# Patient Record
Sex: MALE | Race: OTHER | HISPANIC OR LATINO
[De-identification: names, ages, dates, MRNs, and addresses within clinical notes are randomized per-mention and may not be internally consistent; named-entity substitution may affect disease eponyms.]

---

## 2021-07-01 ENCOUNTER — APPOINTMENT (OUTPATIENT)
Dept: HEART AND VASCULAR | Facility: CLINIC | Age: 86
End: 2021-07-01
Payer: MEDICARE

## 2021-07-01 ENCOUNTER — NON-APPOINTMENT (OUTPATIENT)
Age: 86
End: 2021-07-01

## 2021-07-01 ENCOUNTER — LABORATORY RESULT (OUTPATIENT)
Age: 86
End: 2021-07-01

## 2021-07-01 DIAGNOSIS — R94.31 ABNORMAL ELECTROCARDIOGRAM [ECG] [EKG]: ICD-10-CM

## 2021-07-01 PROCEDURE — 99204 OFFICE O/P NEW MOD 45 MIN: CPT

## 2021-07-01 PROCEDURE — 93000 ELECTROCARDIOGRAM COMPLETE: CPT

## 2021-07-01 PROCEDURE — 36415 COLL VENOUS BLD VENIPUNCTURE: CPT

## 2021-07-02 LAB
ALBUMIN SERPL ELPH-MCNC: 4.4 G/DL
ALP BLD-CCNC: 74 U/L
ALT SERPL-CCNC: 9 U/L
ANION GAP SERPL CALC-SCNC: 15 MMOL/L
AST SERPL-CCNC: 14 U/L
BILIRUB SERPL-MCNC: 0.3 MG/DL
BUN SERPL-MCNC: 29 MG/DL
CALCIUM SERPL-MCNC: 9.7 MG/DL
CHLORIDE SERPL-SCNC: 109 MMOL/L
CHOLEST SERPL-MCNC: 121 MG/DL
CO2 SERPL-SCNC: 22 MMOL/L
CREAT SERPL-MCNC: 1.19 MG/DL
GLUCOSE SERPL-MCNC: 91 MG/DL
HDLC SERPL-MCNC: 40 MG/DL
LDLC SERPL CALC-MCNC: 43 MG/DL
NONHDLC SERPL-MCNC: 81 MG/DL
POTASSIUM SERPL-SCNC: 5.2 MMOL/L
PROT SERPL-MCNC: 7.4 G/DL
SODIUM SERPL-SCNC: 145 MMOL/L
TRIGL SERPL-MCNC: 187 MG/DL

## 2021-07-06 VITALS — SYSTOLIC BLOOD PRESSURE: 120 MMHG | RESPIRATION RATE: 16 BRPM | HEART RATE: 68 BPM | DIASTOLIC BLOOD PRESSURE: 80 MMHG

## 2021-07-06 PROBLEM — R94.31 ABNORMAL ECG: Status: ACTIVE | Noted: 2021-07-06

## 2021-07-06 NOTE — DISCUSSION/SUMMARY
[FreeTextEntry1] : EKG: NSR, LBBB, abnormal EKG\par \par Impression: Stable, non-obstructive CAD with chronic LBBB. He is to continue his medication for DM, HTN and hypercholesteremia and follow up in 6 months.

## 2021-07-06 NOTE — REASON FOR VISIT
CARDIOVASCULAR - ADULT    • Maintains optimal cardiac output and hemodynamic stability Progressing        RISK FOR INFECTION - ADULT    • Absence of fever/infection during anticipated neutropenic period Progressing        SAFETY ADULT - FALL    • Free from [Other: ____] : [unfilled] [Other: _____] : [unfilled] [FreeTextEntry1] : This 86 year old man with a h/o non-obstructive CAD comes in for routine follow up. He was last seen by me in April 2015. Since last being seen he has had no chest pain, SOB, palpitations or syncope.  He is currently living independently in a senior care community here is [place]. He reports being compliant with his medication.

## 2021-12-02 ENCOUNTER — APPOINTMENT (OUTPATIENT)
Dept: HEART AND VASCULAR | Facility: CLINIC | Age: 86
End: 2021-12-02

## 2021-12-07 ENCOUNTER — APPOINTMENT (OUTPATIENT)
Dept: NEUROSURGERY | Facility: CLINIC | Age: 86
End: 2021-12-07
Payer: MEDICARE

## 2021-12-07 PROCEDURE — 99205 OFFICE O/P NEW HI 60 MIN: CPT

## 2021-12-07 NOTE — PHYSICAL EXAM
[General Appearance - Alert] : alert [General Appearance - In No Acute Distress] : in no acute distress [General Appearance - Well Nourished] : well nourished [General Appearance - Well Developed] : well developed [Oriented To Time, Place, And Person] : oriented to person, place, and time [Impaired Insight] : insight and judgment were intact [Affect] : the affect was normal [Cranial Nerves Optic (II)] : visual acuity intact bilaterally,  pupils equal round and reactive to light [Cranial Nerves Oculomotor (III)] : extraocular motion intact [Cranial Nerves Trigeminal (V)] : facial sensation intact symmetrically [Cranial Nerves Facial (VII)] : face symmetrical [Cranial Nerves Vestibulocochlear (VIII)] : hearing was intact bilaterally [Cranial Nerves Glossopharyngeal (IX)] : tongue and palate midline [Cranial Nerves Accessory (XI - Cranial And Spinal)] : head turning and shoulder shrug symmetric [Cranial Nerves Hypoglossal (XII)] : there was no tongue deviation with protrusion [Sensation Tactile Decrease] : light touch was intact [FreeTextEntry1] : Forgetful [FreeTextEntry6] : Magnetic gait [FreeTextEntry8] : Magnetic shuffled gait, ambulates with cane

## 2021-12-07 NOTE — HISTORY OF PRESENT ILLNESS
[de-identified] : Mr. Corrales presents in neurosurgical consultation at the request of Dr. Alexandre Madrigal with his daughter in attendance. He lives at a [facility] in [place]. He has a PMHx of DM II, HTN, glaucoma, alcoholism. Patient and daughter report that he was initially evaluated  by Dr. Madrigal in November 2020 for significant cognitive impairment. MRI brain 11/9/20 demonstrated ventriculomegaly. Brain PET 11/24/20 demonstrated no evidence for metabolic derangement. He returned for a follow up visit with Dr. Madrigal this past January, presenting with progressive forgetfulness and new onset urinary incontinence along with a shuffled gait. At this time the patient was referred to neurosurgery for possible normal pressure hydrocephalus, however the patient and family did not pursue this. He recently underwent follow up MRI brain (8/3) detailed below. \par \par Today the patient's daughter provides the majority of the history. She endorses a great than one year history of progressive urinary incontinence and short term memory loss, with continued progression over the last several months. Gait difficulties have remained largely stable. He is scheduled to participate in physical therapy beginning this Friday. Denies any other neurological symptoms.

## 2021-12-07 NOTE — DATA REVIEWED
[de-identified] : MRI BRAIN WITH AND WITHOUT CONTRAST : 8/3/21: IMPRESSION - Stable ventriculomegaly suspicious for moderate hydrocephalus such as communicating or normal pressure hydrocephalus. Correlate clinically. No evidence of vestibular schwannoma or enhancing lesion. Mild, chronic white matter changes.\par MRI BRAIN WITHOUT CONTRAST: 11/24/20; IMPRESSION- Moderately dilated third ventricle and lateral ventricle greater than expected given the appearance of the cortical sulci. Depending on the clinical circumstances the possibility of communicating/normal pressure hydrocephalus may be considered. Mild white matter changes. No evidence intracranial mass.  [de-identified] : PET CT BRAIN :1/24/20: IMPRESSION- No evidence of metabolic abnormality.

## 2021-12-07 NOTE — ASSESSMENT
[FreeTextEntry1] : Mr. Jarrett presents with a history of progressive short term memory loss, urinary incontinence, and largely stable gait difficulties consisting of magnetic gait. The patient has cardinal signs and symptoms of normal pressure hydrocephalus. MRI brain with and without gadolinium 8/3/21 reviewed independently by me demonstrates moderate ventriculomegaly somewhat out of proportion to volume loss. \par \par Natural history discussed. I discussed lumbar drain CSF diversion trial to assess potential benefit of ventricular shunt placement. Risks including but not limited to bleeding, infection, nerve root injury, pain, hematoma requiring evacuation, weakness/paralysis, loss of bowel/bladder function, sensory loss, failure to place lumbar drain, need for subsequent procedures discussed. The patient and his daughter understand the risks, benefits, alternatives and are considering my recommendations. They also understand the possibility that the patient will not experience improvement, in which case shunt placement will not be recommended. I explained this trial would be to objectively evaluate the patient's improvement with CSF diversion as a positive predictor for clinical improvement with ventricular shunt placement. We discussed risks and potential benefits of ventriculoperitoneal shunt placement as well. \par \par I have asked the patient and his daughter to contact my office regarding their decision. I have made myself available for further discussion with the patient, his daughter, and her siblings if they wish. I have offered to see them in the office again as well. If they decide to proceed we will schedule him for lumbar drain placement under conscious sedation for CSF diversion trial. We will obtain consultation with PT for initial NPH evaluation. We will then drain 15 cc of CSF every 2 hours with serial PT, neurology, and neurosurgery assessments during his hospitalization. If they decide not to proceed, then I have recommended continued outpatient physical therapy and remain available to them for assessment in the future. \par \par I have asked them to contact my office for any symptomatic development or progression at which time we can obtain expedited follow up imaging. \par \par A total of 60 minutes were spent relative to this encounter.

## 2022-02-17 ENCOUNTER — APPOINTMENT (OUTPATIENT)
Dept: HEART AND VASCULAR | Facility: CLINIC | Age: 87
End: 2022-02-17
Payer: MEDICARE

## 2022-02-17 PROCEDURE — 99214 OFFICE O/P EST MOD 30 MIN: CPT

## 2022-02-20 VITALS — RESPIRATION RATE: 16 BRPM | DIASTOLIC BLOOD PRESSURE: 70 MMHG | SYSTOLIC BLOOD PRESSURE: 130 MMHG | HEART RATE: 72 BPM

## 2022-02-20 NOTE — DISCUSSION/SUMMARY
[FreeTextEntry1] : Impression: Stable CAD. He is to continue his medication for HTN, DM and HLD and follow up in 6 months.

## 2022-02-20 NOTE — REASON FOR VISIT
[Coronary Artery Disease] : coronary artery disease [Family Member] : family member [FreeTextEntry1] : This 86 year old man with a h/o non-obstructive CAD and baseline LBBB on EKG comes in for routine follow up. He denies chest pain, SOB, palpitations or syncope. He requests a prescription for Viagra. [place] [facility] [place].

## 2022-08-11 ENCOUNTER — APPOINTMENT (OUTPATIENT)
Dept: HEART AND VASCULAR | Facility: CLINIC | Age: 87
End: 2022-08-11

## 2022-08-11 PROCEDURE — 36415 COLL VENOUS BLD VENIPUNCTURE: CPT

## 2022-08-11 PROCEDURE — 99214 OFFICE O/P EST MOD 30 MIN: CPT | Mod: 25

## 2022-08-15 LAB
ALBUMIN SERPL ELPH-MCNC: 4.4 G/DL
ALP BLD-CCNC: 71 U/L
ALT SERPL-CCNC: 16 U/L
ANION GAP SERPL CALC-SCNC: 17 MMOL/L
AST SERPL-CCNC: 14 U/L
BILIRUB SERPL-MCNC: 0.3 MG/DL
BUN SERPL-MCNC: 27 MG/DL
CALCIUM SERPL-MCNC: 9.4 MG/DL
CHLORIDE SERPL-SCNC: 112 MMOL/L
CHOLEST SERPL-MCNC: 128 MG/DL
CO2 SERPL-SCNC: 16 MMOL/L
CREAT SERPL-MCNC: 1.23 MG/DL
EGFR: 57 ML/MIN/1.73M2
GLUCOSE SERPL-MCNC: 100 MG/DL
HDLC SERPL-MCNC: 41 MG/DL
LDLC SERPL CALC-MCNC: 60 MG/DL
NONHDLC SERPL-MCNC: 87 MG/DL
POTASSIUM SERPL-SCNC: 5.2 MMOL/L
PROT SERPL-MCNC: 7.1 G/DL
SODIUM SERPL-SCNC: 145 MMOL/L
TRIGL SERPL-MCNC: 136 MG/DL

## 2022-08-16 VITALS — HEART RATE: 68 BPM | DIASTOLIC BLOOD PRESSURE: 70 MMHG | SYSTOLIC BLOOD PRESSURE: 136 MMHG | RESPIRATION RATE: 16 BRPM

## 2022-08-16 NOTE — REASON FOR VISIT
[Coronary Artery Disease] : coronary artery disease [FreeTextEntry1] : This 87 year old man with a h/o non-obstructive CAD and baseline LBBB comes in for routine follow up. He denies chest pain, SOB, palpitations or syncope. He lives in a correction community in [facility].

## 2022-08-16 NOTE — DISCUSSION/SUMMARY
[FreeTextEntry1] : Impression: Stable CAD. WIll send blood for cholesterol screening (blood drawn in office).  He is to continue his medication for HTN, DM and HLD.

## 2023-02-09 ENCOUNTER — APPOINTMENT (OUTPATIENT)
Dept: HEART AND VASCULAR | Facility: CLINIC | Age: 88
End: 2023-02-09
Payer: MEDICARE

## 2023-02-09 PROCEDURE — 99214 OFFICE O/P EST MOD 30 MIN: CPT

## 2023-02-24 VITALS — SYSTOLIC BLOOD PRESSURE: 110 MMHG | RESPIRATION RATE: 16 BRPM | DIASTOLIC BLOOD PRESSURE: 76 MMHG | HEART RATE: 72 BPM

## 2023-02-24 NOTE — REASON FOR VISIT
[Coronary Artery Disease] : coronary artery disease [FreeTextEntry1] : This 87 year old man with a h/o non-obstructive CAD comes in for routine follow up. He is currently at an assisted living apartment in [facility]. Today he denies chest pain, SOB, palpitations or syncope.

## 2023-02-24 NOTE — DISCUSSION/SUMMARY
[FreeTextEntry1] : Impression: Stable CAD. He is to continue medication for HTN and HLD and follow up in 6 months.

## 2023-08-31 ENCOUNTER — APPOINTMENT (OUTPATIENT)
Dept: HEART AND VASCULAR | Facility: CLINIC | Age: 88
End: 2023-08-31

## 2023-09-07 ENCOUNTER — APPOINTMENT (OUTPATIENT)
Dept: HEART AND VASCULAR | Facility: CLINIC | Age: 88
End: 2023-09-07
Payer: MEDICARE

## 2023-09-07 PROCEDURE — 36415 COLL VENOUS BLD VENIPUNCTURE: CPT

## 2023-09-07 PROCEDURE — 99214 OFFICE O/P EST MOD 30 MIN: CPT

## 2023-09-08 LAB
ALBUMIN SERPL ELPH-MCNC: 4.4 G/DL
ALP BLD-CCNC: 76 U/L
ALT SERPL-CCNC: 19 U/L
ANION GAP SERPL CALC-SCNC: 16 MMOL/L
AST SERPL-CCNC: 17 U/L
BILIRUB SERPL-MCNC: 0.2 MG/DL
BUN SERPL-MCNC: 19 MG/DL
CALCIUM SERPL-MCNC: 9.2 MG/DL
CHLORIDE SERPL-SCNC: 111 MMOL/L
CHOLEST SERPL-MCNC: 117 MG/DL
CO2 SERPL-SCNC: 18 MMOL/L
CREAT SERPL-MCNC: 1.18 MG/DL
EGFR: 60 ML/MIN/1.73M2
GLUCOSE SERPL-MCNC: 117 MG/DL
HDLC SERPL-MCNC: 40 MG/DL
LDLC SERPL CALC-MCNC: 47 MG/DL
NONHDLC SERPL-MCNC: 76 MG/DL
POTASSIUM SERPL-SCNC: 4.5 MMOL/L
PROT SERPL-MCNC: 7.1 G/DL
SODIUM SERPL-SCNC: 145 MMOL/L
TRIGL SERPL-MCNC: 179 MG/DL

## 2023-09-11 VITALS — DIASTOLIC BLOOD PRESSURE: 60 MMHG | HEART RATE: 44 BPM | SYSTOLIC BLOOD PRESSURE: 90 MMHG | RESPIRATION RATE: 16 BRPM

## 2023-09-28 ENCOUNTER — APPOINTMENT (OUTPATIENT)
Dept: HEART AND VASCULAR | Facility: CLINIC | Age: 88
End: 2023-09-28
Payer: MEDICARE

## 2023-09-28 PROCEDURE — 99214 OFFICE O/P EST MOD 30 MIN: CPT

## 2023-10-02 VITALS — DIASTOLIC BLOOD PRESSURE: 80 MMHG | SYSTOLIC BLOOD PRESSURE: 120 MMHG | RESPIRATION RATE: 16 BRPM | HEART RATE: 64 BPM

## 2023-10-03 ENCOUNTER — TRANSCRIPTION ENCOUNTER (OUTPATIENT)
Age: 88
End: 2023-10-03

## 2023-10-12 ENCOUNTER — TRANSCRIPTION ENCOUNTER (OUTPATIENT)
Age: 88
End: 2023-10-12

## 2023-10-26 ENCOUNTER — APPOINTMENT (OUTPATIENT)
Dept: HEART AND VASCULAR | Facility: CLINIC | Age: 88
End: 2023-10-26
Payer: MEDICARE

## 2023-10-26 DIAGNOSIS — I42.9 CARDIOMYOPATHY, UNSPECIFIED: ICD-10-CM

## 2023-10-26 PROCEDURE — 99214 OFFICE O/P EST MOD 30 MIN: CPT

## 2023-10-30 VITALS — HEART RATE: 72 BPM | RESPIRATION RATE: 16 BRPM | DIASTOLIC BLOOD PRESSURE: 60 MMHG | SYSTOLIC BLOOD PRESSURE: 80 MMHG

## 2023-10-30 PROBLEM — I42.9 SECONDARY CARDIOMYOPATHY: Status: ACTIVE | Noted: 2023-10-30

## 2023-11-09 ENCOUNTER — NON-APPOINTMENT (OUTPATIENT)
Age: 88
End: 2023-11-09

## 2023-11-27 ENCOUNTER — NON-APPOINTMENT (OUTPATIENT)
Age: 88
End: 2023-11-27

## 2023-11-27 ENCOUNTER — APPOINTMENT (OUTPATIENT)
Dept: VASCULAR SURGERY | Facility: CLINIC | Age: 88
End: 2023-11-27
Payer: MEDICARE

## 2023-11-27 VITALS
BODY MASS INDEX: 23.74 KG/M2 | HEART RATE: 71 BPM | WEIGHT: 134 LBS | SYSTOLIC BLOOD PRESSURE: 157 MMHG | DIASTOLIC BLOOD PRESSURE: 78 MMHG | HEIGHT: 63 IN

## 2023-11-27 PROCEDURE — 99203 OFFICE O/P NEW LOW 30 MIN: CPT

## 2023-11-27 RX ORDER — SILDENAFIL 50 MG/1
50 TABLET ORAL
Qty: 30 | Refills: 1 | Status: DISCONTINUED | COMMUNITY
Start: 2022-02-17 | End: 2023-11-27

## 2023-11-27 RX ORDER — SIMVASTATIN 20 MG/1
20 TABLET, FILM COATED ORAL
Refills: 0 | Status: ACTIVE | COMMUNITY

## 2023-11-28 ENCOUNTER — APPOINTMENT (OUTPATIENT)
Dept: VASCULAR SURGERY | Facility: CLINIC | Age: 88
End: 2023-11-28
Payer: MEDICARE

## 2023-11-28 DIAGNOSIS — E11.51 TYPE 2 DIABETES MELLITUS WITH DIABETIC PERIPHERAL ANGIOPATHY W/OUT GANGRENE: ICD-10-CM

## 2023-11-28 PROCEDURE — 93925 LOWER EXTREMITY STUDY: CPT

## 2023-11-28 PROCEDURE — 93922 UPR/L XTREMITY ART 2 LEVELS: CPT

## 2023-12-04 RX ORDER — METOPROLOL SUCCINATE 25 MG/1
25 TABLET, EXTENDED RELEASE ORAL
Qty: 90 | Refills: 1 | Status: ACTIVE | COMMUNITY
Start: 2023-12-04 | End: 1900-01-01

## 2024-01-25 ENCOUNTER — APPOINTMENT (OUTPATIENT)
Dept: HEART AND VASCULAR | Facility: CLINIC | Age: 89
End: 2024-01-25
Payer: MEDICARE

## 2024-01-25 DIAGNOSIS — I42.8 OTHER CARDIOMYOPATHIES: ICD-10-CM

## 2024-01-25 PROCEDURE — 99214 OFFICE O/P EST MOD 30 MIN: CPT

## 2024-01-30 VITALS — DIASTOLIC BLOOD PRESSURE: 80 MMHG | HEART RATE: 68 BPM | SYSTOLIC BLOOD PRESSURE: 140 MMHG | RESPIRATION RATE: 16 BRPM

## 2024-01-30 PROBLEM — I42.8 CARDIOMYOPATHY, NONISCHEMIC: Status: ACTIVE | Noted: 2024-01-30

## 2024-01-30 NOTE — DISCUSSION/SUMMARY
[FreeTextEntry1] : Impression: Will order echocardiogram to assess improvement of cardiomyopathy. He is to restart Metoprolol for HTN. He is to follow up in 3 months.

## 2024-01-30 NOTE — REASON FOR VISIT
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 He had his medication stopped for HTN due to low BP. Today he denies chest pain, SOB, palpitations or syncope. He states he feels much better. His BP is back in the 140-160 range.

## 2024-01-30 NOTE — PHYSICAL EXAM

## 2024-02-16 ENCOUNTER — RESULT REVIEW (OUTPATIENT)
Age: 89
End: 2024-02-16

## 2024-02-20 ENCOUNTER — RESULT REVIEW (OUTPATIENT)
Age: 89
End: 2024-02-20

## 2024-02-22 ENCOUNTER — RESULT REVIEW (OUTPATIENT)
Age: 89
End: 2024-02-22

## 2024-02-22 ENCOUNTER — TRANSCRIPTION ENCOUNTER (OUTPATIENT)
Age: 89
End: 2024-02-22

## 2024-03-06 ENCOUNTER — TRANSCRIPTION ENCOUNTER (OUTPATIENT)
Age: 89
End: 2024-03-06

## 2024-03-13 ENCOUNTER — APPOINTMENT (OUTPATIENT)
Dept: VASCULAR SURGERY | Facility: CLINIC | Age: 89
End: 2024-03-13
Payer: MEDICARE

## 2024-03-13 VITALS
BODY MASS INDEX: 23.39 KG/M2 | SYSTOLIC BLOOD PRESSURE: 136 MMHG | DIASTOLIC BLOOD PRESSURE: 71 MMHG | WEIGHT: 132 LBS | HEART RATE: 72 BPM | HEIGHT: 63 IN

## 2024-03-13 PROCEDURE — 99024 POSTOP FOLLOW-UP VISIT: CPT

## 2024-03-14 NOTE — DISCUSSION/SUMMARY
[FreeTextEntry1] : 87 yo male approximately 3 weeks s/p bilateral open thrombectomy,and fasciotomy. The patient has a 2+ left dp pulse and a biphasic right dp signal on physical exam. The patient is doing well post procedure and his incisions are all healing appropriately. His staples were removed in the  office and his left leg was dressed. I emphasized the importance of leg elevation and ambulation to the patient and his daughter. Continue Eliquis as prescribed.   He will follow up in 4 weeks for reassessment.

## 2024-03-14 NOTE — REVIEW OF SYSTEMS
[As Noted in HPI] : as noted in HPI [Fever] : no fever [Chills] : no chills [Leg Claudication] : no intermittent leg claudication [Limb Swelling] : no limb swelling [Limb Pain] : no limb pain [Lower Ext Edema] : no extremity edema [Limb Weakness] : no limb weakness [Difficulty Walking] : no difficulty walking

## 2024-03-14 NOTE — PHYSICAL EXAM
[Normal Breath Sounds] : Normal breath sounds [2+] : left 2+ [Ankle Swelling Bilaterally] : bilaterally  [Alert] : alert [Oriented to Person] : oriented to person [Oriented to Place] : oriented to place [Oriented to Time] : oriented to time [JVD] : no jugular venous distention  [Ankle Swelling (On Exam)] : not present [FreeTextEntry1] : biphasic PT [de-identified] : Awake and Alert, [de-identified] : Groin Incision sites healing appropriately - staples removed, fasciotomy site clean, bl feet warm and well perfused [de-identified] : Appropriate affect. [de-identified] : No gross motor or sensory deficits.

## 2024-03-14 NOTE — REASON FOR VISIT
[Family Member] : family member [de-identified] : 2/19/24 [de-identified] : Bilateral open thrombectomy, completion angiogram and left lateral and anterior fasciotomy [de-identified] : 89 yo male approximately 3 weeks s/p bilateral open thrombectomy, and left lower extremity fasciotomy for acute lower extremity ischemia. The patient is doing well post procedure and is currently at a rehab facility. The patient ambulates minimally with a walker.  He denies fevers or chills. The patient continues to take Eliquis as prescribed.

## 2024-03-28 ENCOUNTER — APPOINTMENT (OUTPATIENT)
Dept: HEART AND VASCULAR | Facility: CLINIC | Age: 89
End: 2024-03-28
Payer: MEDICARE

## 2024-03-28 PROCEDURE — 99214 OFFICE O/P EST MOD 30 MIN: CPT

## 2024-03-28 RX ORDER — LOSARTAN POTASSIUM 100 MG/1
100 TABLET, FILM COATED ORAL
Refills: 0 | Status: ACTIVE | COMMUNITY

## 2024-04-01 VITALS — HEART RATE: 68 BPM | RESPIRATION RATE: 16 BRPM | SYSTOLIC BLOOD PRESSURE: 108 MMHG | DIASTOLIC BLOOD PRESSURE: 70 MMHG

## 2024-04-01 NOTE — REASON FOR VISIT
[Coronary Artery Disease] : coronary artery disease [Cardiac Failure] : cardiac failure [Other: _____] : [unfilled] [FreeTextEntry1] : This 88 year old diabetic man with recently diagnosed dilated CMP and non-obstructive CAD comes in for follow up. He recently had emergent bilateral LE thrombectomy with Dr. Posada for acute ischemia. He was started on Eliquis. He has also been restarted on his medication for HTN. Today he denies chest pain, SOB, palpitations or syncope. A recent echocardiogram revealed a persistent low EF of 25%.

## 2024-04-01 NOTE — DISCUSSION/SUMMARY
[FreeTextEntry1] : Impression: Stable cardiomyopathy - no acute CHF. Continue current Rx and follow up in 4 months.

## 2024-04-11 ENCOUNTER — APPOINTMENT (OUTPATIENT)
Dept: HEART AND VASCULAR | Facility: CLINIC | Age: 89
End: 2024-04-11

## 2024-04-22 ENCOUNTER — APPOINTMENT (OUTPATIENT)
Dept: VASCULAR SURGERY | Facility: CLINIC | Age: 89
End: 2024-04-22
Payer: MEDICARE

## 2024-04-22 VITALS
DIASTOLIC BLOOD PRESSURE: 73 MMHG | HEART RATE: 75 BPM | HEIGHT: 63 IN | WEIGHT: 132 LBS | SYSTOLIC BLOOD PRESSURE: 166 MMHG | BODY MASS INDEX: 23.39 KG/M2

## 2024-04-22 PROCEDURE — 99024 POSTOP FOLLOW-UP VISIT: CPT

## 2024-04-22 RX ORDER — MULTIVITAMIN
TABLET ORAL
Refills: 0 | Status: ACTIVE | COMMUNITY

## 2024-04-22 NOTE — DISCUSSION/SUMMARY
[FreeTextEntry1] : 87 yo male approximately s/p bilateral open thrombectomy and left lower extremity  fasciotomy. The patient has a 2+ left dp pulse and a biphasic right dp signal on physical exam. The patient is doing well post procedure and his groin incisions are healed. His fasciotomy site is clean and granulating.  Continue local wound care Continue Eliquis  He will follow up in 6 weeks for reassessment.

## 2024-04-22 NOTE — REVIEW OF SYSTEMS
[Fever] : no fever [Chills] : no chills [Leg Claudication] : no intermittent leg claudication [Lower Ext Edema] : no extremity edema [Limb Pain] : no limb pain [Limb Swelling] : no limb swelling [As Noted in HPI] : as noted in HPI [Limb Weakness] : no limb weakness [Difficulty Walking] : no difficulty walking

## 2024-04-22 NOTE — REASON FOR VISIT
[de-identified] : Bilateral open thrombectomy, completion angiogram and left lateral and anterior fasciotomy [de-identified] : 2/19/24 [de-identified] : 87 yo male approximately  s/p bilateral open thrombectomy, and left lower extremity fasciotomy for acute lower extremity ischemia. The patient is doing well post procedure. The patient ambulates minimally with a walker.  He denies fevers or chills. The patient continues to take Eliquis as prescribed. He is undergoing local wound care to his fasciotomy incision with Maxorb.

## 2024-04-22 NOTE — PHYSICAL EXAM
[JVD] : no jugular venous distention  [Normal Breath Sounds] : Normal breath sounds [2+] : left 2+ [Ankle Swelling (On Exam)] : not present [Ankle Swelling Bilaterally] : bilaterally  [Alert] : alert [Oriented to Person] : oriented to person [Oriented to Place] : oriented to place [Oriented to Time] : oriented to time [de-identified] : Awake and Alert, [FreeTextEntry1] : biphasic PT [de-identified] : Groin Incisions - healed, bl feet warm and well perfused, fasciotomy site decreasing in size - clean and granulating [de-identified] : No gross motor or sensory deficits. [de-identified] : Appropriate affect.

## 2024-06-03 ENCOUNTER — APPOINTMENT (OUTPATIENT)
Dept: VASCULAR SURGERY | Facility: CLINIC | Age: 89
End: 2024-06-03
Payer: MEDICARE

## 2024-06-03 VITALS — WEIGHT: 128 LBS | HEIGHT: 63 IN | BODY MASS INDEX: 22.68 KG/M2

## 2024-06-03 DIAGNOSIS — I99.8 OTHER DISORDER OF CIRCULATORY SYSTEM: ICD-10-CM

## 2024-06-03 PROCEDURE — 99213 OFFICE O/P EST LOW 20 MIN: CPT

## 2024-06-03 RX ORDER — ASPIRIN 81 MG
81 TABLET, DELAYED RELEASE (ENTERIC COATED) ORAL
Refills: 0 | Status: ACTIVE | COMMUNITY

## 2024-06-03 RX ORDER — APIXABAN 5 MG/1
5 TABLET, FILM COATED ORAL
Refills: 0 | Status: ACTIVE | COMMUNITY

## 2024-06-03 NOTE — HISTORY OF PRESENT ILLNESS
[FreeTextEntry1] : 89 yo male presents for an initial evaluation of a left foot wound. The patient developed the wound s/p a fall on 10/1/23.  The patient has been undergoing wound care with  and the wound is slowly healing. The patient is offloading the area as well. The patient has a history of DM. He presents for evaluation of his peripheral pulses.  [de-identified] : 89 y/o male returns in follow up s/p bilateral open thrombectomy and left lower extremity fasciotomy for acute lower extremity ischemia in Feb 2024. He is doing well post procedure.  He denies pain in his lower extremities. He is taking Eliquis BID as prescribed.

## 2024-06-03 NOTE — REVIEW OF SYSTEMS
[As Noted in HPI] : as noted in HPI [Fever] : no fever [Chills] : no chills [Leg Claudication] : no intermittent leg claudication [Lower Ext Edema] : no extremity edema [Limb Pain] : no limb pain [Limb Swelling] : no limb swelling [Limb Weakness] : no limb weakness [Difficulty Walking] : no difficulty walking

## 2024-06-03 NOTE — END OF VISIT
[FreeTextEntry3] : All medical record entries made by the Scribe were at my, JOCELYN BRICENO, direction and personally dictated by me on 6/3/2024. I have reviewed the chart and agree that the record accurately reflects my personal performance of the history, physical exam, assessment and plan. I have also personally directed, reviewed, and agreed with the chart.

## 2024-06-03 NOTE — ASSESSMENT
[FreeTextEntry1] : 89 y/o male s/p bilateral open thrombectomy and left lower extremity fasciotomy for acute lower extremity ischemia. He continues to deny pain in his lower extremities bl. He has 2+ femoral pulses bl. He has a 2+ dp pulse on the left and a biphasic PT on the right. The fasciotomy site is healed.  He should continue Eliquis as prescribed. He will follow up with me in 6 months for arterial testing.

## 2024-06-03 NOTE — PHYSICAL EXAM
[Normal Breath Sounds] : Normal breath sounds [2+] : left 2+ [Ankle Swelling Bilaterally] : bilaterally  [Alert] : alert [Oriented to Person] : oriented to person [Oriented to Place] : oriented to place [Oriented to Time] : oriented to time [JVD] : no jugular venous distention  [Ankle Swelling (On Exam)] : not present [de-identified] : Awake and Alert, [FreeTextEntry1] : biphasic PT right [de-identified] : Groin Incisions - healed, bl feet warm and well perfused, fasciotomy site healed [de-identified] : No gross motor or sensory deficits. [de-identified] : Appropriate affect.

## 2024-06-04 ENCOUNTER — TRANSCRIPTION ENCOUNTER (OUTPATIENT)
Age: 89
End: 2024-06-04

## 2024-07-16 PROBLEM — R79.89 ELEVATED TROPONIN: Status: RESOLVED | Noted: 2024-07-16 | Resolved: 2024-07-16

## 2024-07-16 PROBLEM — N17.9 AKI (ACUTE KIDNEY INJURY): Status: RESOLVED | Noted: 2024-07-16 | Resolved: 2024-07-16

## 2024-07-16 PROBLEM — I25.2 HISTORY OF MYOCARDIAL INFARCTION IN ADULTHOOD: Status: RESOLVED | Noted: 2024-07-16 | Resolved: 2024-07-16

## 2024-07-16 PROBLEM — I20.89 ATYPICAL ANGINA: Status: RESOLVED | Noted: 2024-07-16 | Resolved: 2024-07-16

## 2024-07-16 PROBLEM — Z91.81 HISTORY OF FALL: Status: RESOLVED | Noted: 2024-07-16 | Resolved: 2024-07-16

## 2024-07-16 PROBLEM — Z86.79 HISTORY OF CARDIOMYOPATHY: Status: RESOLVED | Noted: 2024-07-16 | Resolved: 2024-07-16

## 2024-07-18 ENCOUNTER — APPOINTMENT (OUTPATIENT)
Dept: HEART AND VASCULAR | Facility: CLINIC | Age: 89
End: 2024-07-18
Payer: MEDICARE

## 2024-07-18 DIAGNOSIS — I50.22 CHRONIC SYSTOLIC (CONGESTIVE) HEART FAILURE: ICD-10-CM

## 2024-07-18 PROCEDURE — G2211 COMPLEX E/M VISIT ADD ON: CPT

## 2024-07-18 PROCEDURE — 99214 OFFICE O/P EST MOD 30 MIN: CPT

## 2024-07-19 ENCOUNTER — APPOINTMENT (OUTPATIENT)
Dept: HEMATOLOGY ONCOLOGY | Facility: CLINIC | Age: 89
End: 2024-07-19

## 2024-07-19 ENCOUNTER — RESULT REVIEW (OUTPATIENT)
Age: 89
End: 2024-07-19

## 2024-07-19 VITALS
WEIGHT: 105.3 LBS | DIASTOLIC BLOOD PRESSURE: 67 MMHG | BODY MASS INDEX: 18.66 KG/M2 | OXYGEN SATURATION: 98 % | SYSTOLIC BLOOD PRESSURE: 147 MMHG | TEMPERATURE: 98.1 F | RESPIRATION RATE: 16 BRPM | HEIGHT: 63 IN | HEART RATE: 66 BPM

## 2024-07-19 DIAGNOSIS — Z87.898 PERSONAL HISTORY OF OTHER SPECIFIED CONDITIONS: ICD-10-CM

## 2024-07-19 DIAGNOSIS — N17.9 ACUTE KIDNEY FAILURE, UNSPECIFIED: ICD-10-CM

## 2024-07-19 DIAGNOSIS — I25.2 OLD MYOCARDIAL INFARCTION: ICD-10-CM

## 2024-07-19 DIAGNOSIS — Z87.891 PERSONAL HISTORY OF NICOTINE DEPENDENCE: ICD-10-CM

## 2024-07-19 DIAGNOSIS — E78.00 PURE HYPERCHOLESTEROLEMIA, UNSPECIFIED: ICD-10-CM

## 2024-07-19 DIAGNOSIS — Z91.81 HISTORY OF FALLING: ICD-10-CM

## 2024-07-19 DIAGNOSIS — Z86.79 PERSONAL HISTORY OF OTHER DISEASES OF THE CIRCULATORY SYSTEM: ICD-10-CM

## 2024-07-19 DIAGNOSIS — E11.51 TYPE 2 DIABETES MELLITUS WITH DIABETIC PERIPHERAL ANGIOPATHY W/OUT GANGRENE: ICD-10-CM

## 2024-07-19 DIAGNOSIS — E11.9 TYPE 2 DIABETES MELLITUS W/OUT COMPLICATIONS: ICD-10-CM

## 2024-07-19 DIAGNOSIS — I10 ESSENTIAL (PRIMARY) HYPERTENSION: ICD-10-CM

## 2024-07-19 DIAGNOSIS — Z82.49 FAMILY HISTORY OF ISCHEMIC HEART DISEASE AND OTHER DISEASES OF THE CIRCULATORY SYSTEM: ICD-10-CM

## 2024-07-19 DIAGNOSIS — D50.8 OTHER IRON DEFICIENCY ANEMIAS: ICD-10-CM

## 2024-07-19 DIAGNOSIS — D64.9 ANEMIA, UNSPECIFIED: ICD-10-CM

## 2024-07-19 DIAGNOSIS — Z86.39 PERSONAL HISTORY OF OTHER ENDOCRINE, NUTRITIONAL AND METABOLIC DISEASE: ICD-10-CM

## 2024-07-19 DIAGNOSIS — R79.89 OTHER SPECIFIED ABNORMAL FINDINGS OF BLOOD CHEMISTRY: ICD-10-CM

## 2024-07-19 DIAGNOSIS — Z83.3 FAMILY HISTORY OF DIABETES MELLITUS: ICD-10-CM

## 2024-07-19 DIAGNOSIS — I20.89 OTHER FORMS OF ANGINA PECTORIS: ICD-10-CM

## 2024-07-19 PROCEDURE — 99204 OFFICE O/P NEW MOD 45 MIN: CPT

## 2024-07-22 ENCOUNTER — APPOINTMENT (OUTPATIENT)
Dept: HEMATOLOGY ONCOLOGY | Facility: CLINIC | Age: 89
End: 2024-07-22

## 2024-07-22 VITALS — DIASTOLIC BLOOD PRESSURE: 80 MMHG | RESPIRATION RATE: 16 BRPM | HEART RATE: 72 BPM | SYSTOLIC BLOOD PRESSURE: 110 MMHG

## 2024-07-22 PROBLEM — I50.22: Status: ACTIVE | Noted: 2024-07-22

## 2024-07-22 PROBLEM — R63.4 WEIGHT LOSS: Status: ACTIVE | Noted: 2024-07-22

## 2024-07-22 PROCEDURE — 99213 OFFICE O/P EST LOW 20 MIN: CPT

## 2024-07-22 NOTE — REVIEW OF SYSTEMS
[Fatigue] : fatigue [Recent Change In Weight] : ~T recent weight change [Shortness Of Breath] : shortness of breath [Diarrhea: Grade 0] : Diarrhea: Grade 0 [Depression] : depression [Negative] : Allergic/Immunologic [Fever] : no fever [Chills] : no chills [FreeTextEntry7] : had diarrhea [de-identified] : neuropathy in hands and feet; feels pain. [de-identified] : fatigue

## 2024-07-22 NOTE — HISTORY OF PRESENT ILLNESS
[de-identified] : 89 y/o M with DM, HTN, Afib (?), referred for iron deficiency anemia.  Found to have blood clots in groin b/l, in 2/2024, vascular surgery done b/l legs.  Has been depressed lately, started on antidepressant since beginning of July.  No blood in stool or melena.  Coloscopy/EGD done at least 10 years ago.  No bleeding reported.  No fevers, chills or NS.  Always feels cold.  Lost weight recently--20 lb weight loss in last 2 weeks.  Has decreased appetite.  Took iron pills, slow Fe for past week.  Gets vitamin B12 injections every 3 months.    7/22/24:  Had diarrhea over weekend.  Is not able to go to bathroom or moving around; has gotten weaker.  Usually, uses a walker/wheelchair/leg weakness.   [de-identified] : Patient presents to office today after being referred by Dr. Hyde for low iron. Patient recently in hospital on 7/10 for fall at home.

## 2024-07-22 NOTE — REASON FOR VISIT
[Initial Consultation] : an initial consultation for [Other: _____] : [unfilled] [FreeTextEntry2] : Anemia, iron deficiency, fatigue

## 2024-07-22 NOTE — ASSESSMENT
[FreeTextEntry1] : 88 y/o M with DM, HTN, Afib (?), referred for iron deficiency anemia.  Found to have blood clots in groin b/l, in 2/2024, vascular surgery done b/l legs.  Has been depressed lately, started on antidepressant since beginning of July.  No blood in stool or melena.  Coloscopy/EGD done at least 10 years ago.  No bleeding reported.  No fevers, chills or NS.  Always feels cold.  Lost weight recently--20 lb weight loss in last 2 weeks.  Has decreased appetite.  Took iron pills, slow Fe for past week.  Gets vitamin B12 injections every 3 months.    Plan: -Hb--low, WBC ct and platelet count normal. -Iron 37  -Cmet normal -Hb was 11.2 on 8/2023 -CBC, cmet, iron profile, vitamin B12, folate, monoclonal studies, MMA, Hapto, LDH--drawn.  Ferritin was 45.   -Recommend GI consultation--has seen Dr. Coles in past.   -Video call done today, on 7/22/24--to review results.  So far, results ok but not all are back yet.   -Since pt has continued to lose weight, will set up CT ch/a/p with contrast.   -F/u within 1 week in office.   -PCP--Dr. Hyde--will send note to him.

## 2024-07-23 LAB
ALBUMIN SERPL ELPH-MCNC: 4.4 G/DL
ALP BLD-CCNC: 154 U/L
ALT SERPL-CCNC: 16 U/L
ANION GAP SERPL CALC-SCNC: 20 MMOL/L
AST SERPL-CCNC: 27 U/L
BILIRUB SERPL-MCNC: 0.3 MG/DL
BUN SERPL-MCNC: 53 MG/DL
CALCIUM SERPL-MCNC: 9.8 MG/DL
CHLORIDE SERPL-SCNC: 102 MMOL/L
CO2 SERPL-SCNC: 17 MMOL/L
CREAT SERPL-MCNC: 1.26 MG/DL
DEPRECATED KAPPA LC FREE/LAMBDA SER: 1.07 RATIO
EGFR: 55 ML/MIN/1.73M2
FERRITIN SERPL-MCNC: 45 NG/ML
GLUCOSE SERPL-MCNC: 119 MG/DL
HAPTOGLOB SERPL-MCNC: 195 MG/DL
IRON SATN MFR SERPL: 11 %
IRON SERPL-MCNC: 32 UG/DL
KAPPA LC CSF-MCNC: 4 MG/DL
KAPPA LC SERPL-MCNC: 4.27 MG/DL
LDH SERPL-CCNC: 290 U/L
POTASSIUM SERPL-SCNC: 5 MMOL/L
PROT SERPL-MCNC: 7.8 G/DL
SODIUM SERPL-SCNC: 139 MMOL/L
TIBC SERPL-MCNC: 288 UG/DL
UIBC SERPL-MCNC: 256 UG/DL
VIT B12 SERPL-MCNC: 391 PG/ML

## 2024-07-24 LAB
ALBUMIN MFR SERPL ELPH: 51.7 %
ALBUMIN SERPL-MCNC: 4 G/DL
ALBUMIN/GLOB SERPL: 1.1 RATIO
ALPHA1 GLOB MFR SERPL ELPH: 4.7 %
ALPHA1 GLOB SERPL ELPH-MCNC: 0.4 G/DL
ALPHA2 GLOB MFR SERPL ELPH: 11.6 %
ALPHA2 GLOB SERPL ELPH-MCNC: 0.9 G/DL
B-GLOBULIN MFR SERPL ELPH: 12.8 %
B-GLOBULIN SERPL ELPH-MCNC: 1 G/DL
GAMMA GLOB FLD ELPH-MCNC: 1.5 G/DL
GAMMA GLOB MFR SERPL ELPH: 19.2 %
INTERPRETATION SERPL IEP-IMP: NORMAL
M PROTEIN MFR SERPL ELPH: NORMAL
M PROTEIN SPEC IFE-MCNC: NORMAL
MONOCLON BAND OBS SERPL: NORMAL
PROT SERPL-MCNC: 7.8 G/DL
PROT SERPL-MCNC: 7.8 G/DL

## 2024-07-29 ENCOUNTER — RESULT REVIEW (OUTPATIENT)
Age: 89
End: 2024-07-29

## 2024-07-29 ENCOUNTER — APPOINTMENT (OUTPATIENT)
Dept: HEMATOLOGY ONCOLOGY | Facility: CLINIC | Age: 89
End: 2024-07-29
Payer: MEDICARE

## 2024-07-29 VITALS
OXYGEN SATURATION: 99 % | HEIGHT: 63 IN | DIASTOLIC BLOOD PRESSURE: 65 MMHG | WEIGHT: 126.5 LBS | SYSTOLIC BLOOD PRESSURE: 110 MMHG | HEART RATE: 70 BPM | BODY MASS INDEX: 22.41 KG/M2 | TEMPERATURE: 98.3 F | RESPIRATION RATE: 16 BRPM

## 2024-07-29 DIAGNOSIS — R63.4 ABNORMAL WEIGHT LOSS: ICD-10-CM

## 2024-07-29 DIAGNOSIS — D64.9 ANEMIA, UNSPECIFIED: ICD-10-CM

## 2024-07-29 PROCEDURE — 99213 OFFICE O/P EST LOW 20 MIN: CPT

## 2024-07-29 RX ORDER — ESCITALOPRAM OXALATE 5 MG/1
TABLET, FILM COATED ORAL
Refills: 0 | Status: ACTIVE | COMMUNITY

## 2024-08-01 NOTE — REVIEW OF SYSTEMS
[Fatigue] : fatigue [Recent Change In Weight] : ~T recent weight change [Shortness Of Breath] : shortness of breath [Diarrhea: Grade 0] : Diarrhea: Grade 0 [Depression] : depression [Negative] : Allergic/Immunologic [de-identified] : fatigue [Fever] : no fever [Chills] : no chills [Joint Pain] : no joint pain [Joint Stiffness] : no joint stiffness [FreeTextEntry2] : fatigue slightly improved [FreeTextEntry7] : occasional diarrhea  [FreeTextEntry8] : occasional incontinence [de-identified] : neuropathy in hands and feet

## 2024-08-01 NOTE — REVIEW OF SYSTEMS
[Fatigue] : fatigue [Recent Change In Weight] : ~T recent weight change [Shortness Of Breath] : shortness of breath [Diarrhea: Grade 0] : Diarrhea: Grade 0 [Depression] : depression [Negative] : Allergic/Immunologic [de-identified] : fatigue [Fever] : no fever [Chills] : no chills [Joint Pain] : no joint pain [Joint Stiffness] : no joint stiffness [FreeTextEntry2] : fatigue slightly improved [FreeTextEntry7] : occasional diarrhea  [FreeTextEntry8] : occasional incontinence [de-identified] : neuropathy in hands and feet

## 2024-08-01 NOTE — HISTORY OF PRESENT ILLNESS
[de-identified] : 88 y/o M with DM, HTN, Afib (?), referred for iron deficiency anemia.  Found to have blood clots in groin b/l, in 2/2024, vascular surgery done b/l legs.  Has been depressed lately, started on antidepressant since beginning of July.  No blood in stool or melena.  Coloscopy/EGD done at least 10 years ago.  No bleeding reported.  No fevers, chills or NS.  Always feels cold.  Lost weight recently--20 lb weight loss in last 2 weeks.  Has decreased appetite.  Took iron pills, slow Fe for past week.  Gets vitamin B12 injections every 3 months.    7/22/24:  Had diarrhea over weekend.  Is not able to go to bathroom or moving around; has gotten weaker.  Usually, uses a walker/wheelchair/leg weakness.   [de-identified] : Patient presents to office today for follow up with daughter at his side. Patient reports feeling a little bit better. He has been having protein shakes and is now ambulating more with his walker. Patient to go for CT scan tomorrow.

## 2024-08-01 NOTE — HISTORY OF PRESENT ILLNESS
[de-identified] : 88 y/o M with DM, HTN, Afib (?), referred for iron deficiency anemia.  Found to have blood clots in groin b/l, in 2/2024, vascular surgery done b/l legs.  Has been depressed lately, started on antidepressant since beginning of July.  No blood in stool or melena.  Coloscopy/EGD done at least 10 years ago.  No bleeding reported.  No fevers, chills or NS.  Always feels cold.  Lost weight recently--20 lb weight loss in last 2 weeks.  Has decreased appetite.  Took iron pills, slow Fe for past week.  Gets vitamin B12 injections every 3 months.    7/22/24:  Had diarrhea over weekend.  Is not able to go to bathroom or moving around; has gotten weaker.  Usually, uses a walker/wheelchair/leg weakness.   [de-identified] : Patient presents to office today for follow up with daughter at his side. Patient reports feeling a little bit better. He has been having protein shakes and is now ambulating more with his walker. Patient to go for CT scan tomorrow.

## 2024-08-01 NOTE — REVIEW OF SYSTEMS
[Fatigue] : fatigue [Recent Change In Weight] : ~T recent weight change [Shortness Of Breath] : shortness of breath [Diarrhea: Grade 0] : Diarrhea: Grade 0 [Depression] : depression [Negative] : Allergic/Immunologic [de-identified] : fatigue [Fever] : no fever [Chills] : no chills [Joint Pain] : no joint pain [Joint Stiffness] : no joint stiffness [FreeTextEntry2] : fatigue slightly improved [FreeTextEntry7] : occasional diarrhea  [FreeTextEntry8] : occasional incontinence [de-identified] : neuropathy in hands and feet

## 2024-08-01 NOTE — HISTORY OF PRESENT ILLNESS
[de-identified] : 89 y/o M with DM, HTN, Afib (?), referred for iron deficiency anemia.  Found to have blood clots in groin b/l, in 2/2024, vascular surgery done b/l legs.  Has been depressed lately, started on antidepressant since beginning of July.  No blood in stool or melena.  Coloscopy/EGD done at least 10 years ago.  No bleeding reported.  No fevers, chills or NS.  Always feels cold.  Lost weight recently--20 lb weight loss in last 2 weeks.  Has decreased appetite.  Took iron pills, slow Fe for past week.  Gets vitamin B12 injections every 3 months.    7/22/24:  Had diarrhea over weekend.  Is not able to go to bathroom or moving around; has gotten weaker.  Usually, uses a walker/wheelchair/leg weakness.   [de-identified] : Patient presents to office today for follow up with daughter at his side. Patient reports feeling a little bit better. He has been having protein shakes and is now ambulating more with his walker. Patient to go for CT scan tomorrow.

## 2024-08-01 NOTE — ASSESSMENT
[FreeTextEntry1] : 89 y/o M with DM, HTN, Afib (?), referred for iron deficiency anemia.  Found to have blood clots in groin b/l, in 2/2024, vascular surgery done b/l legs.  Has been depressed lately, started on antidepressant since beginning of July.  No blood in stool or melena.  Coloscopy/EGD done at least 10 years ago.  No bleeding reported.  No fevers, chills or NS.  Always feels cold.  Lost weight recently--20 lb weight loss in last 2 weeks.  Has decreased appetite.  Took iron pills, slow Fe for past week.  Gets vitamin B12 injections every 3 months.    Plan: -Hb--low, WBC ct and platelet count normal. -Iron 37  -Cmet normal -Hb was 11.2 on 8/2023 -CBC, cmet, iron profile, vitamin B12, folate, monoclonal studies, MMA, Hapto, LDH--drawn.  Ferritin was 45.   -Recommend GI consultation--has seen Dr. Coles in past.   -Video call done on 7/22/24--to review results.  So far, results ok but not all are back yet.   -Since pt has continued to lose weight, set up CT ch/a/p with contrast--will be having it tomorrow.   -F/u within 1 week in office.   -PCP--Dr. Hyde--will send note to him.

## 2024-08-07 ENCOUNTER — APPOINTMENT (OUTPATIENT)
Dept: HEMATOLOGY ONCOLOGY | Facility: CLINIC | Age: 89
End: 2024-08-07

## 2024-08-09 ENCOUNTER — RESULT REVIEW (OUTPATIENT)
Age: 89
End: 2024-08-09

## 2024-08-09 ENCOUNTER — APPOINTMENT (OUTPATIENT)
Dept: HEMATOLOGY ONCOLOGY | Facility: CLINIC | Age: 89
End: 2024-08-09

## 2024-08-09 PROCEDURE — 99214 OFFICE O/P EST MOD 30 MIN: CPT

## 2024-08-09 NOTE — HISTORY OF PRESENT ILLNESS
[de-identified] : 89 y/o M with DM, HTN, Afib (?), referred for iron deficiency anemia.  Found to have blood clots in groin b/l, in 2/2024, vascular surgery done b/l legs.  Has been depressed lately, started on antidepressant since beginning of July.  No blood in stool or melena.  Coloscopy/EGD done at least 10 years ago.  No bleeding reported.  No fevers, chills or NS.  Always feels cold.  Lost weight recently--20 lb weight loss in last 2 weeks.  Has decreased appetite.  Took iron pills, slow Fe for past week.  Gets vitamin B12 injections every 3 months.    7/22/24:  Had diarrhea over weekend.  Is not able to go to bathroom or moving around; has gotten weaker.  Usually, uses a walker/wheelchair/leg weakness.   [de-identified] : Patient presents to office today for follow up with daughter at his side. Patient reports feeling a little bit better. He has been having protein shakes and is now ambulating more with his walker. Patient to go for CT scan tomorrow.

## 2024-08-09 NOTE — REVIEW OF SYSTEMS
[Fatigue] : fatigue [Recent Change In Weight] : ~T recent weight change [Shortness Of Breath] : shortness of breath [Diarrhea: Grade 0] : Diarrhea: Grade 0 [Depression] : depression [Negative] : Allergic/Immunologic [Fever] : no fever [Chills] : no chills [Joint Pain] : no joint pain [Joint Stiffness] : no joint stiffness [FreeTextEntry2] : fatigue slightly improved [FreeTextEntry7] : occasional diarrhea  [FreeTextEntry8] : occasional incontinence [de-identified] : neuropathy in hands and feet

## 2024-08-09 NOTE — REVIEW OF SYSTEMS
[Fatigue] : fatigue [Recent Change In Weight] : ~T recent weight change [Shortness Of Breath] : shortness of breath [Diarrhea: Grade 0] : Diarrhea: Grade 0 [Depression] : depression [Negative] : Allergic/Immunologic [Fever] : no fever [Chills] : no chills [Joint Pain] : no joint pain [Joint Stiffness] : no joint stiffness [FreeTextEntry2] : fatigue slightly improved [FreeTextEntry7] : occasional diarrhea  [FreeTextEntry8] : occasional incontinence [de-identified] : neuropathy in hands and feet

## 2024-08-09 NOTE — REVIEW OF SYSTEMS
[Fatigue] : fatigue [Recent Change In Weight] : ~T recent weight change [Shortness Of Breath] : shortness of breath [Diarrhea: Grade 0] : Diarrhea: Grade 0 [Depression] : depression [Negative] : Allergic/Immunologic [Fever] : no fever [Chills] : no chills [Joint Pain] : no joint pain [Joint Stiffness] : no joint stiffness [FreeTextEntry2] : fatigue slightly improved [FreeTextEntry7] : occasional diarrhea  [FreeTextEntry8] : occasional incontinence [de-identified] : neuropathy in hands and feet

## 2024-08-09 NOTE — HISTORY OF PRESENT ILLNESS
[de-identified] : 89 y/o M with DM, HTN, Afib (?), referred for iron deficiency anemia.  Found to have blood clots in groin b/l, in 2/2024, vascular surgery done b/l legs.  Has been depressed lately, started on antidepressant since beginning of July.  No blood in stool or melena.  Coloscopy/EGD done at least 10 years ago.  No bleeding reported.  No fevers, chills or NS.  Always feels cold.  Lost weight recently--20 lb weight loss in last 2 weeks.  Has decreased appetite.  Took iron pills, slow Fe for past week.  Gets vitamin B12 injections every 3 months.    7/22/24:  Had diarrhea over weekend.  Is not able to go to bathroom or moving around; has gotten weaker.  Usually, uses a walker/wheelchair/leg weakness.   [de-identified] : Patient presents to office today for follow up with daughter at his side. Patient reports feeling a little bit better. He has been having protein shakes and is now ambulating more with his walker. Patient to go for CT scan tomorrow.

## 2024-08-09 NOTE — HISTORY OF PRESENT ILLNESS
[de-identified] : 88 y/o M with DM, HTN, Afib (?), referred for iron deficiency anemia.  Found to have blood clots in groin b/l, in 2/2024, vascular surgery done b/l legs.  Has been depressed lately, started on antidepressant since beginning of July.  No blood in stool or melena.  Coloscopy/EGD done at least 10 years ago.  No bleeding reported.  No fevers, chills or NS.  Always feels cold.  Lost weight recently--20 lb weight loss in last 2 weeks.  Has decreased appetite.  Took iron pills, slow Fe for past week.  Gets vitamin B12 injections every 3 months.    7/22/24:  Had diarrhea over weekend.  Is not able to go to bathroom or moving around; has gotten weaker.  Usually, uses a walker/wheelchair/leg weakness.   [de-identified] : Patient presents to office today for follow up with daughter at his side. Patient reports feeling a little bit better. He has been having protein shakes and is now ambulating more with his walker. Patient to go for CT scan tomorrow.

## 2024-08-09 NOTE — ASSESSMENT
[FreeTextEntry1] : 88 y/o M with DM, HTN, Afib (?), referred for iron deficiency anemia.  Found to have blood clots in groin b/l, in 2/2024, vascular surgery done b/l legs.  Has been depressed lately, started on antidepressant since beginning of July.  No blood in stool or melena.  Coloscopy/EGD done at least 10 years ago.  No bleeding reported.  No fevers, chills or NS.  Always feels cold.  Lost weight recently--20 lb weight loss in last 2 weeks.  Has decreased appetite.  Took iron pills, slow Fe for past week.  Gets vitamin B12 injections every 3 months.    Plan: -Hb--low, WBC ct and platelet count normal. -Iron 37  -Cmet normal -Hb was 11.2 on 8/2023 -CBC, cmet, iron profile, vitamin B12, folate, monoclonal studies, MMA, Hapto, LDH--drawn.  Ferritin was 19 and now, 45.   -Recommend GI consultation--has seen Dr. Coles in past--reminded him again to make apptmt with him today.  Will need colonoscopy/EGD and capsule endoscopy to assess why he is iron deficient.   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 -Prescribed iron polysaccharide 150 mg BID, to take with vitamin C to increase absorption -F/u in 1 month and need to check CBC, cmet, iron profile, ferritin -PCP--Dr. Hyde--will send note to him.

## 2024-09-10 NOTE — ASSESSMENT
[FreeTextEntry1] : 89 y/o M with DM, HTN, Afib (?), referred for iron deficiency anemia.  Found to have blood clots in groin b/l, in 2/2024, vascular surgery done b/l legs.  Has been depressed lately, started on antidepressant since beginning of July.  No blood in stool or melena.  Coloscopy/EGD done at least 10 years ago.  No bleeding reported.  No fevers, chills or NS.  Always feels cold.  Lost weight recently--20 lb weight loss in last 2 weeks.  Has decreased appetite.  Took iron pills, slow Fe for past week.  Gets vitamin B12 injections every 3 months.    Plan: -Hb--low, WBC ct and platelet count normal. -Iron 37  -Cmet normal -Hb was 11.2 on 8/2023 -CBC, cmet, iron profile, vitamin B12, folate, monoclonal studies, MMA, Hapto, LDH--drawn.  Ferritin was 19 and now, 45.   -Recommend GI consultation--has seen Dr. Coles in past--reminded him again to make apptmt with him today.  Will need colonoscopy/EGD and capsule endoscopy to assess why he is iron deficient.   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 -Prescribed iron polysaccharide 150 mg BID, to take with vitamin C to increase absorption -F/u in 1 month and need to check CBC, cmet, iron profile, ferritin -PCP--Dr. Hyde--will send note to him.

## 2024-09-10 NOTE — HISTORY OF PRESENT ILLNESS
[de-identified] : 89 y/o M with DM, HTN, Afib (?), referred for iron deficiency anemia.  Found to have blood clots in groin b/l, in 2/2024, vascular surgery done b/l legs.  Has been depressed lately, started on antidepressant since beginning of July.  No blood in stool or melena.  Coloscopy/EGD done at least 10 years ago.  No bleeding reported.  No fevers, chills or NS.  Always feels cold.  Lost weight recently--20 lb weight loss in last 2 weeks.  Has decreased appetite.  Took iron pills, slow Fe for past week.  Gets vitamin B12 injections every 3 months.    7/22/24:  Had diarrhea over weekend.  Is not able to go to bathroom or moving around; has gotten weaker.  Usually, uses a walker/wheelchair/leg weakness.   [de-identified] : Patient presents to office today for follow up with daughter at his side. Patient reports feeling a little bit better. He has been having protein shakes and is now ambulating more with his walker. Patient to go for CT scan tomorrow.

## 2024-09-10 NOTE — REVIEW OF SYSTEMS
[Fever] : no fever [Chills] : no chills [Fatigue] : fatigue [Recent Change In Weight] : ~T recent weight change [Shortness Of Breath] : shortness of breath [Diarrhea: Grade 0] : Diarrhea: Grade 0 [Joint Pain] : no joint pain [Joint Stiffness] : no joint stiffness [Depression] : depression [Negative] : Allergic/Immunologic [FreeTextEntry2] : fatigue slightly improved [FreeTextEntry7] : occasional diarrhea  [FreeTextEntry8] : occasional incontinence [de-identified] : neuropathy in hands and feet

## 2024-09-13 ENCOUNTER — APPOINTMENT (OUTPATIENT)
Dept: HEMATOLOGY ONCOLOGY | Facility: CLINIC | Age: 89
End: 2024-09-13
Payer: MEDICARE

## 2024-09-14 ENCOUNTER — TRANSCRIPTION ENCOUNTER (OUTPATIENT)
Age: 89
End: 2024-09-14

## 2024-09-16 ENCOUNTER — APPOINTMENT (OUTPATIENT)
Dept: HEMATOLOGY ONCOLOGY | Facility: CLINIC | Age: 89
End: 2024-09-16
Payer: MEDICARE

## 2024-09-16 VITALS
DIASTOLIC BLOOD PRESSURE: 74 MMHG | RESPIRATION RATE: 16 BRPM | BODY MASS INDEX: 20.55 KG/M2 | TEMPERATURE: 99.4 F | HEIGHT: 63 IN | OXYGEN SATURATION: 97 % | SYSTOLIC BLOOD PRESSURE: 150 MMHG | HEART RATE: 80 BPM | WEIGHT: 116 LBS

## 2024-09-16 PROCEDURE — 99213 OFFICE O/P EST LOW 20 MIN: CPT

## 2024-09-16 NOTE — ASSESSMENT
[FreeTextEntry1] : 89 y/o M with DM, HTN, Afib (?), referred for iron deficiency anemia.  Found to have blood clots in groin b/l, in 2/2024, vascular surgery done b/l legs.  Has been depressed lately, started on antidepressant since beginning of July.  No blood in stool or melena.  Coloscopy/EGD done at least 10 years ago.  No bleeding reported.  No fevers, chills or NS.  Always feels cold.  Lost weight recently--20 lb weight loss in last 2 weeks.  Has decreased appetite.  Took iron pills, slow Fe for past week.  Gets vitamin B12 injections every 3 months.    Plan: -Hb--low, WBC ct and platelet count normal. -Iron 37  -Cmet normal -Hb was 11.2 on 8/2023 -CBC, cmet, iron profile, vitamin B12, folate, monoclonal studies, MMA, Hapto, LDH--drawn.  Ferritin was 19 and now, 45.   -Recommend GI consultation--has seen Dr. Coles in past--reminded him again to make apptmt with him today.  Will need colonoscopy/EGD and capsule endoscopy to assess why he is iron deficient.   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 -S/p iron IV x 5. -Pt had CBC done on 9/14 showing Hb of 10.  I added on full anemia w/u again.  Got iron infusions but Hb has not improved.   -Added tests onto "[facility] from admission in 9/2024--will do video visit with him in 1 week to go over results.   -PCP--Dr. Hyde--will send note to him.

## 2024-09-16 NOTE — ASSESSMENT
[FreeTextEntry1] : 88 y/o M with DM, HTN, Afib (?), referred for iron deficiency anemia.  Found to have blood clots in groin b/l, in 2/2024, vascular surgery done b/l legs.  Has been depressed lately, started on antidepressant since beginning of July.  No blood in stool or melena.  Coloscopy/EGD done at least 10 years ago.  No bleeding reported.  No fevers, chills or NS.  Always feels cold.  Lost weight recently--20 lb weight loss in last 2 weeks.  Has decreased appetite.  Took iron pills, slow Fe for past week.  Gets vitamin B12 injections every 3 months.    Plan: -Hb--low, WBC ct and platelet count normal. -Iron 37  -Cmet normal -Hb was 11.2 on 8/2023 -CBC, cmet, iron profile, vitamin B12, folate, monoclonal studies, MMA, Hapto, LDH--drawn.  Ferritin was 19 and now, 45.   -Recommend GI consultation--has seen Dr. Coles in past--reminded him again to make apptmt with him today.  Will need colonoscopy/EGD and capsule endoscopy to assess why he is iron deficient.   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 -S/p iron IV x 5. -Pt had CBC done on 9/14 showing Hb of 10.  I added on full anemia w/u again.  Got iron infusions but Hb has not improved.   -Added tests onto [facility] from admission in 9/2024--will do video visit with him in 1 week to go over results.   -PCP--Dr. Hyde--will send note to him.

## 2024-09-16 NOTE — HISTORY OF PRESENT ILLNESS
[de-identified] : 89 y/o M with DM, HTN, Afib (?), referred for iron deficiency anemia.  Found to have blood clots in groin b/l, in 2/2024, vascular surgery done b/l legs.  Has been depressed lately, started on antidepressant since beginning of July.  No blood in stool or melena.  Coloscopy/EGD done at least 10 years ago.  No bleeding reported.  No fevers, chills or NS.  Always feels cold.  Lost weight recently--20 lb weight loss in last 2 weeks.  Has decreased appetite.  Took iron pills, slow Fe for past week.  Gets vitamin B12 injections every 3 months.    7/22/24:  Had diarrhea over weekend.  Is not able to go to bathroom or moving around; has gotten weaker.  Usually, uses a walker/wheelchair/leg weakness.    9/16/24:  Feeling weak and has had frequent falls. In the hospital over the weekend.   [de-identified] : Patient presents to office today for follow up with daughter Rama at his side. As per Rama, patient was in the hospital this Friday after they noticed he was leaning to the right and had minimal trunk control. As per daughter, they ruled the patient out for a stroke. Stayed over the weekend for observation. Patient has been having frequent falls where he is on the floor for an hour due to struggling to pick him up.  Patient reports feeling 80/100. Endorses fatigue but denies pain and dizziness.  Daughter feels as those his father is FTT. Appears defeated.   Patient has not been taking oral iron but completed his 5 doses of IV iron. Completed IV iron doses around end of August.

## 2024-09-16 NOTE — REVIEW OF SYSTEMS
[Difficulty Walking] : difficulty walking [Negative] : Respiratory [Fever] : no fever [Chills] : no chills [Joint Pain] : no joint pain [Joint Stiffness] : no joint stiffness [Dizziness] : no dizziness [FreeTextEntry7] : occasional diarrhea  [FreeTextEntry2] : fatigue  [FreeTextEntry8] : occasional incontinence [de-identified] : neuropathy in hands and feet;l frequent falls

## 2024-09-16 NOTE — HISTORY OF PRESENT ILLNESS
[de-identified] : 88 y/o M with DM, HTN, Afib (?), referred for iron deficiency anemia.  Found to have blood clots in groin b/l, in 2/2024, vascular surgery done b/l legs.  Has been depressed lately, started on antidepressant since beginning of July.  No blood in stool or melena.  Coloscopy/EGD done at least 10 years ago.  No bleeding reported.  No fevers, chills or NS.  Always feels cold.  Lost weight recently--20 lb weight loss in last 2 weeks.  Has decreased appetite.  Took iron pills, slow Fe for past week.  Gets vitamin B12 injections every 3 months.    7/22/24:  Had diarrhea over weekend.  Is not able to go to bathroom or moving around; has gotten weaker.  Usually, uses a walker/wheelchair/leg weakness.    9/16/24:  Feeling weak and has had frequent falls. In the hospital over the weekend.   [de-identified] : Patient presents to office today for follow up with daughter Rama at his side. As per Rama, patient was in the hospital this Friday after they noticed he was leaning to the right and had minimal trunk control. As per daughter, they ruled the patient out for a stroke. Stayed over the weekend for observation. Patient has been having frequent falls where he is on the floor for an hour due to struggling to pick him up.  Patient reports feeling 80/100. Endorses fatigue but denies pain and dizziness.  Daughter feels as those his father is FTT. Appears defeated.   Patient has not been taking oral iron but completed his 5 doses of IV iron. Completed IV iron doses around end of August.

## 2024-09-16 NOTE — REVIEW OF SYSTEMS
[Difficulty Walking] : difficulty walking [Negative] : Respiratory [Fever] : no fever [Chills] : no chills [Joint Pain] : no joint pain [Joint Stiffness] : no joint stiffness [Dizziness] : no dizziness [FreeTextEntry2] : fatigue  [FreeTextEntry7] : occasional diarrhea  [FreeTextEntry8] : occasional incontinence [de-identified] : neuropathy in hands and feet;l frequent falls

## 2024-09-23 ENCOUNTER — APPOINTMENT (OUTPATIENT)
Dept: HEMATOLOGY ONCOLOGY | Facility: CLINIC | Age: 89
End: 2024-09-23
Payer: MEDICARE

## 2024-09-23 PROCEDURE — 99213 OFFICE O/P EST LOW 20 MIN: CPT

## 2024-09-23 NOTE — HISTORY OF PRESENT ILLNESS
[de-identified] : 88 y/o M with DM, HTN, Afib (?), referred for iron deficiency anemia.  Found to have blood clots in groin b/l, in 2/2024, vascular surgery done b/l legs.  Has been depressed lately, started on antidepressant since beginning of July.  No blood in stool or melena.  Coloscopy/EGD done at least 10 years ago.  No bleeding reported.  No fevers, chills or NS.  Always feels cold.  Lost weight recently--20 lb weight loss in last 2 weeks.  Has decreased appetite.  Took iron pills, slow Fe for past week.  Gets vitamin B12 injections every 3 months.    7/22/24:  Had diarrhea over weekend.  Is not able to go to bathroom or moving around; has gotten weaker.  Usually, uses a walker/wheelchair/leg weakness.    9/16/24:  Feeling weak and has had frequent falls. In the hospital over the weekend.    9/23/24:  No complaints [de-identified] : Patient presents to office today for follow up with daughter Rama at his side. As per Rama, patient was in the hospital this Friday after they noticed he was leaning to the right and had minimal trunk control. As per daughter, they ruled the patient out for a stroke. Stayed over the weekend for observation. Patient has been having frequent falls where he is on the floor for an hour due to struggling to pick him up.  Patient reports feeling 80/100. Endorses fatigue but denies pain and dizziness.  Daughter feels as those his father is FTT. Appears defeated.   Patient has not been taking oral iron but completed his 5 doses of IV iron. Completed IV iron doses around end of August.

## 2024-09-23 NOTE — REVIEW OF SYSTEMS
[Fatigue] : fatigue [Recent Change In Weight] : ~T recent weight change [Shortness Of Breath] : shortness of breath [Diarrhea: Grade 0] : Diarrhea: Grade 0 [Difficulty Walking] : difficulty walking [Depression] : depression [Negative] : Allergic/Immunologic [Fever] : no fever [Chills] : no chills [Joint Pain] : no joint pain [Joint Stiffness] : no joint stiffness [Dizziness] : no dizziness [FreeTextEntry2] : fatigue  [FreeTextEntry7] : occasional diarrhea  [FreeTextEntry8] : occasional incontinence [de-identified] : neuropathy in hands and feet;l frequent falls

## 2024-09-23 NOTE — ASSESSMENT
[FreeTextEntry1] : 88 y/o M with DM, HTN, Afib (?), referred for iron deficiency anemia.  Found to have blood clots in groin b/l, in 2/2024, vascular surgery done b/l legs.  Has been depressed lately, started on antidepressant since beginning of July.  No blood in stool or melena.  Coloscopy/EGD done at least 10 years ago.  No bleeding reported.  No fevers, chills or NS.  Always feels cold.  Lost weight recently--20 lb weight loss in last 2 weeks.  Has decreased appetite.  Took iron pills, slow Fe for past week.  Gets vitamin B12 injections every 3 months.    Plan: -Hb--low, WBC ct and platelet count normal. -Iron 37  -Cmet normal -Hb was 11.2 on 8/2023 -CBC, cmet, iron profile, vitamin B12, folate, monoclonal studies, MMA, Hapto, LDH--drawn.  Ferritin was 19 and now, 45.   -Recommend GI consultation--has seen Dr. Coles in past--reminded him again to make apptmt with him today.  Will need colonoscopy/EGD and capsule endoscopy to assess why he is iron deficient.   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 -S/p iron IV x 5. -Pt had CBC done on 9/14 showing Hb of 10.   -Added tests onto [facility] from admission in 9/2024---Full anemia w/u showed normal ferritin.  Hb is improving.  Will see him in a month to see if Hb has normalized.   -if Hb does not normalize by then, may need BM biopsy as no other cause apparent from peripheral blood testing.   -PCP--Dr. Hyde--will send note to him.

## 2024-09-23 NOTE — REASON FOR VISIT
[Follow-Up Visit] : a follow-up visit for [Other: _____] : [unfilled] [Home] : at home, [unfilled] , at the time of the visit. [Medical Office: ([facility])___] : at the medical office located in  [Other:____] : [unfilled] [Patient] : the patient [FreeTextEntry2] : Anemia, iron deficiency, fatigue

## 2024-09-23 NOTE — HISTORY OF PRESENT ILLNESS
[de-identified] : 89 y/o M with DM, HTN, Afib (?), referred for iron deficiency anemia.  Found to have blood clots in groin b/l, in 2/2024, vascular surgery done b/l legs.  Has been depressed lately, started on antidepressant since beginning of July.  No blood in stool or melena.  Coloscopy/EGD done at least 10 years ago.  No bleeding reported.  No fevers, chills or NS.  Always feels cold.  Lost weight recently--20 lb weight loss in last 2 weeks.  Has decreased appetite.  Took iron pills, slow Fe for past week.  Gets vitamin B12 injections every 3 months.    7/22/24:  Had diarrhea over weekend.  Is not able to go to bathroom or moving around; has gotten weaker.  Usually, uses a walker/wheelchair/leg weakness.    9/16/24:  Feeling weak and has had frequent falls. In the hospital over the weekend.    9/23/24:  No complaints [de-identified] : Patient presents to office today for follow up with daughter Rama at his side. As per Rama, patient was in the hospital this Friday after they noticed he was leaning to the right and had minimal trunk control. As per daughter, they ruled the patient out for a stroke. Stayed over the weekend for observation. Patient has been having frequent falls where he is on the floor for an hour due to struggling to pick him up.  Patient reports feeling 80/100. Endorses fatigue but denies pain and dizziness.  Daughter feels as those his father is FTT. Appears defeated.   Patient has not been taking oral iron but completed his 5 doses of IV iron. Completed IV iron doses around end of August.

## 2024-09-23 NOTE — REVIEW OF SYSTEMS
[Fatigue] : fatigue [Recent Change In Weight] : ~T recent weight change [Shortness Of Breath] : shortness of breath [Diarrhea: Grade 0] : Diarrhea: Grade 0 [Difficulty Walking] : difficulty walking [Depression] : depression [Negative] : Allergic/Immunologic [Fever] : no fever [Chills] : no chills [Joint Pain] : no joint pain [Joint Stiffness] : no joint stiffness [Dizziness] : no dizziness [FreeTextEntry2] : fatigue  [FreeTextEntry7] : occasional diarrhea  [FreeTextEntry8] : occasional incontinence [de-identified] : neuropathy in hands and feet;l frequent falls

## 2024-09-23 NOTE — ASSESSMENT
[FreeTextEntry1] : 89 y/o M with DM, HTN, Afib (?), referred for iron deficiency anemia.  Found to have blood clots in groin b/l, in 2/2024, vascular surgery done b/l legs.  Has been depressed lately, started on antidepressant since beginning of July.  No blood in stool or melena.  Coloscopy/EGD done at least 10 years ago.  No bleeding reported.  No fevers, chills or NS.  Always feels cold.  Lost weight recently--20 lb weight loss in last 2 weeks.  Has decreased appetite.  Took iron pills, slow Fe for past week.  Gets vitamin B12 injections every 3 months.    Plan: -Hb--low, WBC ct and platelet count normal. -Iron 37  -Cmet normal -Hb was 11.2 on 8/2023 -CBC, cmet, iron profile, vitamin B12, folate, monoclonal studies, MMA, Hapto, LDH--drawn.  Ferritin was 19 and now, 45.   -Recommend GI consultation--has seen Dr. Coles in past--reminded him again to make apptmt with him today.  Will need colonoscopy/EGD and capsule endoscopy to assess why he is iron deficient.   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 -S/p iron IV x 5. -Pt had CBC done on 9/14 showing Hb of 10.   -Added tests onto [facility] from admission in 9/2024---Full anemia w/u showed normal ferritin.  Hb is improving.  Will see him in a month to see if Hb has normalized.   -if Hb does not normalize by then, may need BM biopsy as no other cause apparent from peripheral blood testing.   -PCP--Dr. Hyde--will send note to him.

## 2024-09-23 NOTE — REASON FOR VISIT
[Follow-Up Visit] : a follow-up visit for [Other: _____] : [unfilled] [Home] : at home, [unfilled] , at the time of the visit. [Medical Office: ([facility])___] : at the medical office located in  [Other:____] : [unfilled] [Patient] : the patient [FreeTextEntry2] : Anemia, iron deficiency, fatigue

## 2024-10-02 ENCOUNTER — APPOINTMENT (OUTPATIENT)
Dept: HEMATOLOGY ONCOLOGY | Facility: CLINIC | Age: 89
End: 2024-10-02

## 2024-12-02 ENCOUNTER — APPOINTMENT (OUTPATIENT)
Dept: VASCULAR SURGERY | Facility: CLINIC | Age: 89
End: 2024-12-02
